# Patient Record
Sex: MALE | Race: WHITE | NOT HISPANIC OR LATINO | URBAN - METROPOLITAN AREA
[De-identification: names, ages, dates, MRNs, and addresses within clinical notes are randomized per-mention and may not be internally consistent; named-entity substitution may affect disease eponyms.]

---

## 2019-09-03 ENCOUNTER — EMERGENCY (EMERGENCY)
Facility: HOSPITAL | Age: 34
LOS: 1 days | Discharge: HOME | End: 2019-09-03
Admitting: EMERGENCY MEDICINE
Payer: COMMERCIAL

## 2019-09-03 VITALS
WEIGHT: 164.91 LBS | TEMPERATURE: 97 F | RESPIRATION RATE: 16 BRPM | OXYGEN SATURATION: 98 % | HEIGHT: 71 IN | HEART RATE: 77 BPM | SYSTOLIC BLOOD PRESSURE: 138 MMHG | DIASTOLIC BLOOD PRESSURE: 84 MMHG

## 2019-09-03 PROCEDURE — 99282 EMERGENCY DEPT VISIT SF MDM: CPT

## 2019-09-03 NOTE — ED PROVIDER NOTE - PATIENT PORTAL LINK FT
You can access the FollowMyHealth Patient Portal offered by [facility] by registering at the following website: [email or web address]. By joining [employer]’s FollowMyHealth portal, you will also be able to view your health information using other applications (apps) compatible with our system.

## 2019-09-03 NOTE — ED PROVIDER NOTE - PROGRESS NOTE DETAILS
Patient refusing CT/CTA - discussed risks and benefits, including potential for cervical artery dissection and stroke.  He is alert and oriented, lucid and holds a normal, reasonable conversation.  He is able to explain these concerns back to me; he has a meeting in the morning and is requesting discharge.  There is a corporate physician at his office who he says he will see today to arrange follow up evaluation.  He agrees to return immediately to ED if he has any recurrent symptoms, or any other new/concerning symptoms.

## 2019-09-03 NOTE — ED PROVIDER NOTE - NS ED ROS FT
CONSTITUTIONAL: No fever, chills, or weakness  NEURO: No headache, no dizziness, no syncope; No focal weakness/tingling/numbness  EYES: HPI  ENT: No rhinorrhea or sore throat; tinnitus as noted.  PULM: No cough or dyspnea  CV: No chest pain or palpitations  GI: No abdominal pain, vomiting, or diarrhea  : No dysuria, hematuria, frequency  MSK: No neck pain or back pain, no joint pain  SKIN: no rash or unusual bruising

## 2019-09-03 NOTE — ED PROVIDER NOTE - NSFOLLOWUPINSTRUCTIONS_ED_ALL_ED_FT
Please see your physician today.  Return to ED immediately if you have any recurrent symptoms, or any other new/concerning symptoms.

## 2019-09-03 NOTE — ED PROVIDER NOTE - OBJECTIVE STATEMENT
33 yo m with remote hx of melanoma excision c/o transient vision change.  He was laying prone on bed (head over end of bed) shortly before midnight tonight looking at [employer] on his mobile phone; had been in this position for about a half hour and then rolled over onto his back when he suddenly had severe blurriness of vision in both eyes.  He could not read any words on his phone and he says he began to panic and then had ringing in his ears.  He was able to make out the phone number buttons and call 911.  The symptoms began to resolve after about 2 minutes while on the 911 call.  He had no headache, eye pain, diplopia, dizziness, change/loss in taste, or other focal neurologic symptoms.  Currently asymptomatic.  Was wearing contact lenses during symptoms, and is wearing them now.  No recollection of recent neck manipulation or trauma, but did an upper body workout earlier in the day.      PMHx melanoma  PSHx melanoma excision  Meds none  NKA  Social: former smoker for 3 yrs.  No cocaine/other drug use.  Family: both parents with VTE, one with Factor V Leiden.  Patient tested negative for hypercoagulable state.

## 2019-09-03 NOTE — ED PROVIDER NOTE - CLINICAL SUMMARY MEDICAL DECISION MAKING FREE TEXT BOX
Transient blurry vision; symptoms completely resolved and has normal neuro exam: ddx includes cervical vascular dissection (fairly low suspicion) and migraine (though no headache developed).  Recommend  CT and CTA head-neck.

## 2019-09-09 DIAGNOSIS — H53.8 OTHER VISUAL DISTURBANCES: ICD-10-CM

## 2020-07-07 NOTE — ED PROVIDER NOTE - PRINCIPAL DIAGNOSIS
Detail Level: Simple Additional Notes: Patient consent was obtained to proceed with the visit and recommended plan of care after discussion of all risks and benefits, including the risks of COVID-19 exposure. Transient vision disturbance, bilateral

## 2021-02-26 NOTE — ED ADULT NURSE NOTE - NSIMPLEMENTINTERV_GEN_ALL_ED
Implemented All Universal Safety Interventions:  [place] to call system. Call bell, personal items and telephone within reach. Instruct patient to call for assistance. Room bathroom lighting operational. Non-slip footwear when patient is off stretcher. Physically safe environment: no spills, clutter or unnecessary equipment. Stretcher in lowest position, wheels locked, appropriate side rails in place. 88